# Patient Record
Sex: MALE | Race: BLACK OR AFRICAN AMERICAN | NOT HISPANIC OR LATINO | Employment: FULL TIME | URBAN - METROPOLITAN AREA
[De-identification: names, ages, dates, MRNs, and addresses within clinical notes are randomized per-mention and may not be internally consistent; named-entity substitution may affect disease eponyms.]

---

## 2018-01-15 ENCOUNTER — HOSPITAL ENCOUNTER (EMERGENCY)
Facility: OTHER | Age: 28
Discharge: HOME | End: 2018-01-16
Attending: EMERGENCY MEDICINE
Payer: COMMERCIAL

## 2018-01-15 ENCOUNTER — OFFICE VISIT (OUTPATIENT)
Dept: URGENT CARE | Facility: CLINIC | Age: 28
End: 2018-01-15
Payer: COMMERCIAL

## 2018-01-15 VITALS
BODY MASS INDEX: 22.07 KG/M2 | RESPIRATION RATE: 16 BRPM | HEART RATE: 101 BPM | OXYGEN SATURATION: 95 % | WEIGHT: 149 LBS | TEMPERATURE: 99 F | SYSTOLIC BLOOD PRESSURE: 118 MMHG | HEIGHT: 69 IN | DIASTOLIC BLOOD PRESSURE: 82 MMHG

## 2018-01-15 DIAGNOSIS — R50.9 FEVER AND CHILLS: ICD-10-CM

## 2018-01-15 DIAGNOSIS — J45.901 EXACERBATION OF ASTHMA, UNSPECIFIED ASTHMA SEVERITY, UNSPECIFIED WHETHER PERSISTENT: Primary | ICD-10-CM

## 2018-01-15 DIAGNOSIS — J45.909 ASTHMA, UNSPECIFIED ASTHMA SEVERITY, UNSPECIFIED WHETHER COMPLICATED, UNSPECIFIED WHETHER PERSISTENT: Primary | ICD-10-CM

## 2018-01-15 DIAGNOSIS — R68.83 CHILLS: ICD-10-CM

## 2018-01-15 DIAGNOSIS — F41.9 ANXIETY: ICD-10-CM

## 2018-01-15 LAB
ALBUMIN SERPL BCP-MCNC: 4 G/DL
ALP SERPL-CCNC: 65 U/L
ALT SERPL W/O P-5'-P-CCNC: 22 U/L
ANION GAP SERPL CALC-SCNC: 9 MMOL/L
AST SERPL-CCNC: 31 U/L
BASOPHILS # BLD AUTO: 0.01 K/UL
BASOPHILS NFR BLD: 0.2 %
BILIRUB SERPL-MCNC: 0.2 MG/DL
BUN SERPL-MCNC: 12 MG/DL
CALCIUM SERPL-MCNC: 9.4 MG/DL
CHLORIDE SERPL-SCNC: 100 MMOL/L
CO2 SERPL-SCNC: 28 MMOL/L
CREAT SERPL-MCNC: 1 MG/DL
CTP QC/QA: YES
DIFFERENTIAL METHOD: ABNORMAL
EOSINOPHIL # BLD AUTO: 0 K/UL
EOSINOPHIL NFR BLD: 0.4 %
ERYTHROCYTE [DISTWIDTH] IN BLOOD BY AUTOMATED COUNT: 12.5 %
EST. GFR  (AFRICAN AMERICAN): >60 ML/MIN/1.73 M^2
EST. GFR  (NON AFRICAN AMERICAN): >60 ML/MIN/1.73 M^2
FLUAV AG NPH QL: NEGATIVE
FLUBV AG NPH QL: NEGATIVE
GLUCOSE SERPL-MCNC: 142 MG/DL
HCT VFR BLD AUTO: 42.2 %
HGB BLD-MCNC: 14.4 G/DL
LYMPHOCYTES # BLD AUTO: 0.8 K/UL
LYMPHOCYTES NFR BLD: 16 %
MCH RBC QN AUTO: 33.2 PG
MCHC RBC AUTO-ENTMCNC: 34.1 G/DL
MCV RBC AUTO: 97 FL
MONOCYTES # BLD AUTO: 0.1 K/UL
MONOCYTES NFR BLD: 2.3 %
NEUTROPHILS # BLD AUTO: 3.9 K/UL
NEUTROPHILS NFR BLD: 80.9 %
PLATELET # BLD AUTO: 151 K/UL
PMV BLD AUTO: 10.2 FL
POTASSIUM SERPL-SCNC: 4.8 MMOL/L
PROT SERPL-MCNC: 7.6 G/DL
RBC # BLD AUTO: 4.34 M/UL
SODIUM SERPL-SCNC: 137 MMOL/L
WBC # BLD AUTO: 4.76 K/UL

## 2018-01-15 PROCEDURE — 85025 COMPLETE CBC W/AUTO DIFF WBC: CPT

## 2018-01-15 PROCEDURE — 99284 EMERGENCY DEPT VISIT MOD MDM: CPT | Mod: 25

## 2018-01-15 PROCEDURE — 87804 INFLUENZA ASSAY W/OPTIC: CPT | Mod: 59,QW,S$GLB, | Performed by: FAMILY MEDICINE

## 2018-01-15 PROCEDURE — 94640 AIRWAY INHALATION TREATMENT: CPT

## 2018-01-15 PROCEDURE — 99204 OFFICE O/P NEW MOD 45 MIN: CPT | Mod: 25,S$GLB,, | Performed by: FAMILY MEDICINE

## 2018-01-15 PROCEDURE — 63600175 PHARM REV CODE 636 W HCPCS: Performed by: EMERGENCY MEDICINE

## 2018-01-15 PROCEDURE — 80053 COMPREHEN METABOLIC PANEL: CPT

## 2018-01-15 PROCEDURE — 25000242 PHARM REV CODE 250 ALT 637 W/ HCPCS: Performed by: EMERGENCY MEDICINE

## 2018-01-15 PROCEDURE — 94640 AIRWAY INHALATION TREATMENT: CPT | Mod: 59,S$GLB,, | Performed by: FAMILY MEDICINE

## 2018-01-15 PROCEDURE — 96372 THER/PROPH/DIAG INJ SC/IM: CPT | Mod: S$GLB,,, | Performed by: FAMILY MEDICINE

## 2018-01-15 RX ORDER — BENZONATATE 100 MG/1
200 CAPSULE ORAL 3 TIMES DAILY PRN
Qty: 30 CAPSULE | Refills: 0 | Status: SHIPPED | OUTPATIENT
Start: 2018-01-15 | End: 2018-01-25

## 2018-01-15 RX ORDER — ALBUTEROL SULFATE 0.83 MG/ML
2.5 SOLUTION RESPIRATORY (INHALATION)
Status: COMPLETED | OUTPATIENT
Start: 2018-01-15 | End: 2018-01-15

## 2018-01-15 RX ORDER — BETAMETHASONE SODIUM PHOSPHATE AND BETAMETHASONE ACETATE 3; 3 MG/ML; MG/ML
6 INJECTION, SUSPENSION INTRA-ARTICULAR; INTRALESIONAL; INTRAMUSCULAR; SOFT TISSUE
Status: COMPLETED | OUTPATIENT
Start: 2018-01-15 | End: 2018-01-15

## 2018-01-15 RX ORDER — PREDNISONE 20 MG/1
60 TABLET ORAL
Status: COMPLETED | OUTPATIENT
Start: 2018-01-15 | End: 2018-01-15

## 2018-01-15 RX ORDER — BUDESONIDE 0.5 MG/2ML
0.5 INHALANT ORAL 2 TIMES DAILY
Qty: 120 ML | Refills: 0 | Status: SHIPPED | OUTPATIENT
Start: 2018-01-15 | End: 2018-02-14

## 2018-01-15 RX ORDER — IPRATROPIUM BROMIDE 0.5 MG/2.5ML
0.5 SOLUTION RESPIRATORY (INHALATION)
Status: COMPLETED | OUTPATIENT
Start: 2018-01-15 | End: 2018-01-15

## 2018-01-15 RX ORDER — AZITHROMYCIN 250 MG/1
TABLET, FILM COATED ORAL
Qty: 2 TABLET | Refills: 0 | Status: SHIPPED | OUTPATIENT
Start: 2018-01-15 | End: 2018-01-20

## 2018-01-15 RX ORDER — IPRATROPIUM BROMIDE AND ALBUTEROL SULFATE 2.5; .5 MG/3ML; MG/3ML
3 SOLUTION RESPIRATORY (INHALATION)
Status: COMPLETED | OUTPATIENT
Start: 2018-01-15 | End: 2018-01-15

## 2018-01-15 RX ORDER — ALBUTEROL SULFATE 0.63 MG/3ML
0.63 SOLUTION RESPIRATORY (INHALATION) EVERY 6 HOURS PRN
COMMUNITY
End: 2019-01-17

## 2018-01-15 RX ADMIN — PREDNISONE 60 MG: 20 TABLET ORAL at 09:01

## 2018-01-15 RX ADMIN — IPRATROPIUM BROMIDE AND ALBUTEROL SULFATE 3 ML: .5; 3 SOLUTION RESPIRATORY (INHALATION) at 10:01

## 2018-01-15 RX ADMIN — IPRATROPIUM BROMIDE 0.5 MG: 0.5 SOLUTION RESPIRATORY (INHALATION) at 07:01

## 2018-01-15 RX ADMIN — ALBUTEROL SULFATE 2.5 MG: 0.83 SOLUTION RESPIRATORY (INHALATION) at 07:01

## 2018-01-15 RX ADMIN — BETAMETHASONE SODIUM PHOSPHATE AND BETAMETHASONE ACETATE 6 MG: 3; 3 INJECTION, SUSPENSION INTRA-ARTICULAR; INTRALESIONAL; INTRAMUSCULAR; SOFT TISSUE at 07:01

## 2018-01-15 RX ADMIN — IPRATROPIUM BROMIDE AND ALBUTEROL SULFATE 3 ML: .5; 3 SOLUTION RESPIRATORY (INHALATION) at 11:01

## 2018-01-16 VITALS
TEMPERATURE: 99 F | WEIGHT: 149 LBS | DIASTOLIC BLOOD PRESSURE: 70 MMHG | RESPIRATION RATE: 20 BRPM | SYSTOLIC BLOOD PRESSURE: 145 MMHG | OXYGEN SATURATION: 91 % | HEIGHT: 69 IN | BODY MASS INDEX: 22.07 KG/M2 | HEART RATE: 114 BPM

## 2018-01-16 RX ORDER — PREDNISONE 20 MG/1
40 TABLET ORAL DAILY
Qty: 10 TABLET | Refills: 0 | Status: SHIPPED | OUTPATIENT
Start: 2018-01-16 | End: 2018-01-21

## 2018-01-16 NOTE — ED PROVIDER NOTES
"Encounter Date: 1/15/2018    SCRIBE #1 NOTE: I, Amee Benavidez, am scribing for, and in the presence of, Dr. Zamarripa.       History     Chief Complaint   Patient presents with    Hypoxia     Pt sent to ED from urgent care for dec o2 sats     Time seen by provider: 9:41 PM    This is a 27 y.o. male, with h/o asthma, who presents with complaint of hypoxia. He started to have URI sx 2 weeks ago, including sneezing, cough, SOB, wheezing, and general weakness. Four days ago he started to have fever, back pain ("tight"), myalgias, and headache. He was seen in urgent care initially, was given two breathing treatments and steroid shot with some improvement, but was sent here for SpO2 of 81%. He has not had steroids before for asthma flare ups, but uses Albuterol regularly. He denies abdominal pain, nausea, diarrhea, constipation, or rash.       The history is provided by the patient.     Review of patient's allergies indicates:  No Known Allergies  Past Medical History:   Diagnosis Date    Anxiety     Asthma     HIV (human immunodeficiency virus infection)      History reviewed. No pertinent surgical history.  Family History   Problem Relation Age of Onset    Diabetes Mother      Social History   Substance Use Topics    Smoking status: Current Every Day Smoker    Smokeless tobacco: Never Used      Comment: 1 pack every 3 days    Alcohol use No     Review of Systems   Constitutional: Positive for fever.   HENT: Positive for sneezing. Negative for sore throat.    Respiratory: Positive for cough, shortness of breath and wheezing.    Cardiovascular: Negative for chest pain.   Gastrointestinal: Negative for abdominal pain, constipation and diarrhea.   Genitourinary: Negative for dysuria.   Musculoskeletal: Positive for back pain and myalgias.   Skin: Negative for rash.   Neurological: Positive for weakness and headaches.   Hematological: Does not bruise/bleed easily.       Physical Exam     Initial Vitals [01/15/18 " 2056]   BP Pulse Resp Temp SpO2   134/75 (!) 111 18 99.3 °F (37.4 °C) (!) 90 %      MAP       94.67         Physical Exam    Nursing note and vitals reviewed.  Constitutional: He appears well-developed and well-nourished. He is not diaphoretic. No distress.   HENT:   Head: Normocephalic and atraumatic.   Eyes: Conjunctivae and EOM are normal.   Neck: Normal range of motion. Neck supple.   Cardiovascular: Normal rate, regular rhythm and normal heart sounds. Exam reveals no gallop and no friction rub.    No murmur heard.  Pulmonary/Chest: No respiratory distress. He has decreased breath sounds (diffusely). He has no wheezes. He has no rhonchi. He has no rales.   Abdominal: Soft. Bowel sounds are normal. He exhibits no distension. There is no tenderness. There is no rebound and no guarding.   Musculoskeletal: Normal range of motion. He exhibits no edema or tenderness.   Neurological: He is alert and oriented to person, place, and time.   Skin: Skin is warm and dry. No rash noted. No pallor.   Psychiatric: He has a normal mood and affect. His behavior is normal. Judgment and thought content normal.         ED Course   Procedures  Labs Reviewed   CBC W/ AUTO DIFFERENTIAL - Abnormal; Notable for the following:        Result Value    RBC 4.34 (*)     MCH 33.2 (*)     Lymph # 0.8 (*)     Mono # 0.1 (*)     Gran% 80.9 (*)     Lymph% 16.0 (*)     Mono% 2.3 (*)     All other components within normal limits   COMPREHENSIVE METABOLIC PANEL - Abnormal; Notable for the following:     Glucose 142 (*)     All other components within normal limits             Medical Decision Making:   Initial Assessment:       28 y/o male with h/o asthma sent from Urgent Care due to hypoxia. He had URI symptoms for the past two weeks that worsened in the past four days to flu-like syndrome with cough, myalgias. Work up at urgent care with no pneumonia on X-Ray and negative flu. He was given steroid shot and nebs with some improvement. O2 sat 91% on  arrival with no wheezing, patient resting comfortably with no distress.   Likely asthma exacerbation due to URI.    Labs show no acute findings. He was given Prednisone and additional nebs with no wheezing or SOB on reassessment. His O2 sat remained in the low 90s on room air and with activity. I discussed admission vs discharge with patient, and patient does not want admission since he feels much better. Will add Prednisone x 4 additional days to Rx given by Urgent Care which was Z-Daquan, steroid inhaler, cough suppressant. Will return for any worsening symptoms and follow up with PCP.      Clinical Tests:   Lab Tests: Ordered and Reviewed            Scribe Attestation:   Scribe #1: I performed the above scribed service and the documentation accurately describes the services I performed. I attest to the accuracy of the note.    Attending Attestation:           Physician Attestation for Scribe:  Physician Attestation Statement for Scribe #1: I, Dr. Zamarripa, reviewed documentation, as scribed by Amee Benavidez in my presence, and it is both accurate and complete.                 ED Course      Clinical Impression:     1. Exacerbation of asthma, unspecified asthma severity, unspecified whether persistent                                 Jimbo Zamarripa MD  01/16/18 0255

## 2018-01-16 NOTE — PATIENT INSTRUCTIONS
"  Asthma (Adult)  Asthma is a disease where the medium and  small air passages within the lung go into spasm and restrict the flow of air. Inflammation and swelling of the airways cause further restriction. During an acute asthma attack, these factors cause difficulty breathing, wheezing, cough and chest tightness.    An asthma attack can be triggered by many things. Common triggers include infections such as the common cold, bronchitis, pneumonia. Irritants such as smoke or pollutants in the air, emotional upset, and exercise can also trigger an attack. In many adults with asthma, allergies to dust, mold, pollen and animal dander can cause an asthma attack. Skipping doses of daily asthma medicine can also bring on an asthma attack.  Asthma can be controlled using the proper medicines prescribed by your healthcare provider and avoiding exposure to known triggers including allergens and irritants.  Home care  · Take prescribed medicine exactly at the times advised. If you need medicine such as from a hand held inhaler or aerosol breathing machine more than every 4 hours, contact your healthcare provider or seek immediate medical attention. If prescribed an antibiotic or prednisone, take all of the medicine as prescribed, even if you are feeling better after a few days.  · Do not smoke. Avoid being exposed to the smoke of others.  · Some people with asthma have worsening of their symptoms when they take aspirin and non-steroidal or fever-reducing medicines like ibuprofen and naproxen. Talk to your healthcare provider if you think this may apply to you.  Follow-up care  Follow up with your healthcare provider, or as advised. Always bring all of your current medicines to any appointments with your healthcare provider. Also bring a complete list of medications even those not taken for asthma. If you do not already have one, talk to your healthcare provider about developing a personalized "Asthma Action Plan."  A " pneumococcal (pneumonia) vaccine and yearly flu shot (every fall) are recommended. Ask your doctor about this.  When to seek medical advice  Call your healthcare provider right away if any of these occur:   · Increased wheezing or shortness of breath  · Need to use your inhalers more often than usual without relief  · Fever of 100.4ºF (38ºC) or higher, or as directed by your healthcare provider  · Coughing up lots of dark-colored or bloody sputum (mucus)  · Chest pain with each breath  · If you use a peak flow meter as part of an Asthma Action Plan, and you are still in the yellow zone (50% to 80%) 15 minutes after using inhaler medicine.  Call 911  Call 911 if any of the following occur  · Trouble walking or talking because of shortness of breath  · If you use a peak flow meter as part of an Asthma Action Plan and you are still in the red zone (less than 50%) 15 minutes after using inhaler medicine  · Lips or fingernails turning gray or blue  Date Last Reviewed: 12/2/2015  © 6934-9471 [employer]. [address] [place] [address], [place] [address]. All rights reserved. This information is not intended as a substitute for professional medical care. Always follow your healthcare professional's instructions.

## 2018-01-16 NOTE — PROGRESS NOTES
"Subjective:       Patient ID: Roosevelt Dove is a 27 y.o. male.    Vitals:  height is 5' 9" (1.753 m) and weight is 67.6 kg (149 lb). His temperature is 99 °F (37.2 °C). His blood pressure is 118/82 and his pulse is 101. His respiration is 16 and oxygen saturation is 95%.     Chief Complaint: Generalized Body Aches (started Thursday)    Pt started having a cough and nasal congestion about 2 weeks ago. Pt started getting body aches on Thursday. Pt tried taking Theraflu Liquid Night time and advil cold and sinus, but no improvement. Pt says his mouth been dry for the past couple of days. Pt been using his nebulizer every 2 hours because sometimes he get SOB. Pt does have a hx of asthma. He uses his albuterol inhaler as needed. Pt work at a [employer].   Patient does not know who is PCP is.  He reports that they gave him a refill on asthma medication for about 10 times.  His mother gave him one abx  Today for his symptoms.  He can not take deep breaths at this time.  You can here him audibly wheeze without a stethoscope. Bb  Patient has HIV and is undetectable but prefer it not be in his history. Bb      Other   This is a chronic problem. The current episode started 1 to 4 weeks ago (2 weeks ago). The problem occurs constantly. The problem has been gradually worsening. Associated symptoms include chills, congestion, coughing (dry ) and headaches (frontal). Pertinent negatives include no abdominal pain, chest pain, fever, myalgias, nausea or sore throat.     Review of Systems   Constitution: Positive for chills and decreased appetite. Negative for fever and malaise/fatigue.   HENT: Positive for congestion. Negative for ear pain, hoarse voice and sore throat.    Eyes: Negative for discharge and redness.   Cardiovascular: Negative for chest pain, dyspnea on exertion and leg swelling.   Respiratory: Positive for cough (dry ) and wheezing. Negative for shortness of breath and sputum production.  "   Musculoskeletal: Negative for myalgias.   Gastrointestinal: Negative for abdominal pain and nausea.   Neurological: Positive for headaches (frontal).       Objective:      Physical Exam   Constitutional: He is oriented to person, place, and time. He appears well-developed and well-nourished. He is cooperative.  Non-toxic appearance. He appears ill. No distress.   HENT:   Head: Normocephalic and atraumatic.   Right Ear: Hearing, tympanic membrane, external ear and ear canal normal.   Left Ear: Hearing, tympanic membrane, external ear and ear canal normal.   Nose: Nose normal. No mucosal edema, rhinorrhea or nasal deformity. No epistaxis. Right sinus exhibits no maxillary sinus tenderness and no frontal sinus tenderness. Left sinus exhibits no maxillary sinus tenderness and no frontal sinus tenderness.   Mouth/Throat: Uvula is midline and mucous membranes are normal. No trismus in the jaw. Normal dentition. No uvula swelling. Posterior oropharyngeal erythema present.   Eyes: Conjunctivae and lids are normal. No scleral icterus.   Sclera clear bilat   Neck: Trachea normal, full passive range of motion without pain and phonation normal. Neck supple.   Cardiovascular: Regular rhythm, normal heart sounds, intact distal pulses and normal pulses.  Tachycardia present.    Pulmonary/Chest: Effort normal. No respiratory distress. He has decreased breath sounds.   Pre peak flow is 220  Post is 260 from one treatment 1945.    After two treatments patient has better air movement with his peak flow still being in the low 200s  He was instructed to follow up with his PCP next week, or report to the ED for any SOB, or wheezing.     Patient transferred to [facility] ED spoke with Jordan Charge nurse for report  Patient stable when  Leaving clinic.  Speaking in full sentences.  Bb       Abdominal: Soft. Normal appearance and bowel sounds are normal. He exhibits no distension. There is no tenderness.   Musculoskeletal: Normal range of  motion. He exhibits no edema or deformity.   Neurological: He is alert and oriented to person, place, and time. He exhibits normal muscle tone. Coordination normal.   Skin: Skin is warm, dry and intact. He is not diaphoretic. No pallor.   Psychiatric: He has a normal mood and affect. His speech is normal and behavior is normal. Judgment and thought content normal. Cognition and memory are normal.   Nursing note and vitals reviewed.     No pneumonia noted on films.  Two nipple studs noted on film.  Results for orders placed or performed in visit on 01/15/18   POCT Influenza A/B   Result Value Ref Range    Rapid Influenza A Ag Negative Negative    Rapid Influenza B Ag Negative Negative     Acceptable Yes        Assessment:       1. Asthma, unspecified asthma severity, unspecified whether complicated, unspecified whether persistent    2. Chills    3. Fever and chills    4. Anxiety        Plan:         Asthma, unspecified asthma severity, unspecified whether complicated, unspecified whether persistent  -     albuterol nebulizer solution 2.5 mg; Take 3 mLs (2.5 mg total) by nebulization one time.  -     ipratropium 0.02 % nebulizer solution 0.5 mg; Take 2.5 mLs (0.5 mg total) by nebulization one time.  -     X-Ray Chest PA And Lateral; Future; Expected date: 01/15/2018  -     Vital capacity test; Future; Expected date: 01/15/2018  -     benzonatate (TESSALON PERLES) 100 MG capsule; Take 2 capsules (200 mg total) by mouth 3 (three) times daily as needed for Cough.  Dispense: 30 capsule; Refill: 0  -     Cancel: Refer to Emergency Dept.    Chills  -     POCT Influenza A/B    Fever and chills  -     X-Ray Chest PA And Lateral; Future; Expected date: 01/15/2018    Anxiety    Other orders  -     betamethasone acetate-betamethasone sodium phosphate injection 6 mg; Inject 1 mL (6 mg total) into the muscle one time.  -     albuterol nebulizer solution 2.5 mg; Take 3 mLs (2.5 mg total) by nebulization one time.  -      ipratropium 0.02 % nebulizer solution 0.5 mg; Take 2.5 mLs (0.5 mg total) by nebulization one time.  -     budesonide (PULMICORT) 0.5 mg/2 mL nebulizer solution; Take 2 mLs (0.5 mg total) by nebulization 2 (two) times daily. Use every 6 hours while acutely ill then decrease to twice daily  Dispense: 120 mL; Refill: 0  -     azithromycin (ZITHROMAX Z-TERESA) 250 MG tablet; Take 2 tablets (500 mg) on  Day 1,  followed by 1 tablet (250 mg) once daily on Days 2 through 5.  Dispense: 2 tablet; Refill: 0

## 2018-01-16 NOTE — ED TRIAGE NOTES
"Pt states " I was at urgent care and my oxygen was around 81% on room air. The doctor at urgent care told me that I needed to come to the ER. I received 2 breathing treatments and it didn't help my oxygen level. I have been sick for about 2 weeks. They did the flu swab at urgent care and it was negative. ". Pt sat 91% RA upon assessment. 2L NC applied to pt. Will continue to monitor.   "

## 2019-01-06 ENCOUNTER — HOSPITAL ENCOUNTER (EMERGENCY)
Facility: OTHER | Age: 29
Discharge: HOME | End: 2019-01-06
Attending: EMERGENCY MEDICINE
Payer: COMMERCIAL

## 2019-01-06 VITALS
HEART RATE: 102 BPM | SYSTOLIC BLOOD PRESSURE: 138 MMHG | OXYGEN SATURATION: 96 % | WEIGHT: 145 LBS | BODY MASS INDEX: 21.48 KG/M2 | RESPIRATION RATE: 18 BRPM | DIASTOLIC BLOOD PRESSURE: 83 MMHG | TEMPERATURE: 98 F | HEIGHT: 69 IN

## 2019-01-06 DIAGNOSIS — J45.901 EXACERBATION OF ASTHMA, UNSPECIFIED ASTHMA SEVERITY, UNSPECIFIED WHETHER PERSISTENT: ICD-10-CM

## 2019-01-06 DIAGNOSIS — J06.9 VIRAL URI WITH COUGH: Primary | ICD-10-CM

## 2019-01-06 PROCEDURE — 94640 AIRWAY INHALATION TREATMENT: CPT

## 2019-01-06 PROCEDURE — 63600175 PHARM REV CODE 636 W HCPCS: Performed by: PHYSICIAN ASSISTANT

## 2019-01-06 PROCEDURE — 25000242 PHARM REV CODE 250 ALT 637 W/ HCPCS: Performed by: PHYSICIAN ASSISTANT

## 2019-01-06 PROCEDURE — 99285 EMERGENCY DEPT VISIT HI MDM: CPT | Mod: 25

## 2019-01-06 RX ORDER — PREDNISONE 20 MG/1
60 TABLET ORAL
Status: COMPLETED | OUTPATIENT
Start: 2019-01-06 | End: 2019-01-06

## 2019-01-06 RX ORDER — BENZONATATE 100 MG/1
100 CAPSULE ORAL 3 TIMES DAILY PRN
Qty: 20 CAPSULE | Refills: 0 | Status: SHIPPED | OUTPATIENT
Start: 2019-01-06 | End: 2019-01-16

## 2019-01-06 RX ORDER — IPRATROPIUM BROMIDE AND ALBUTEROL SULFATE 2.5; .5 MG/3ML; MG/3ML
3 SOLUTION RESPIRATORY (INHALATION)
Status: COMPLETED | OUTPATIENT
Start: 2019-01-06 | End: 2019-01-06

## 2019-01-06 RX ORDER — PREDNISONE 20 MG/1
60 TABLET ORAL DAILY
Qty: 12 TABLET | Refills: 0 | Status: SHIPPED | OUTPATIENT
Start: 2019-01-06 | End: 2019-01-10

## 2019-01-06 RX ORDER — ALBUTEROL SULFATE 2.5 MG/.5ML
2.5 SOLUTION RESPIRATORY (INHALATION) EVERY 4 HOURS PRN
Qty: 20 EACH | Refills: 0 | OUTPATIENT
Start: 2019-01-06 | End: 2019-01-17

## 2019-01-06 RX ADMIN — IPRATROPIUM BROMIDE AND ALBUTEROL SULFATE 3 ML: .5; 3 SOLUTION RESPIRATORY (INHALATION) at 11:01

## 2019-01-06 RX ADMIN — PREDNISONE 60 MG: 20 TABLET ORAL at 11:01

## 2019-01-06 NOTE — ED PROVIDER NOTES
"Encounter Date: 1/6/2019       History     Chief Complaint   Patient presents with    Cough     Pt c/o clear productive cough for the past week with frequent "asthma attacks" relieved with tx's.     Asthma     Patient is a 28-year-old male with a past medical history of asthma, HIV, presenting to the emergency department with complaints of a cough with asthma exacerbation.  The patient states that for the past week he has had an upper respiratory infection.  He states he feels as though it is making his asthma worse, especially over the past 2 days.  He admits that prior to arrival he took a home breathing treatment, stating it helped but that his symptoms are already returning.  He reports his cough is productive of sputum and phlegm.  He denies any shortness of breath or chest pain.  He denies any fever chills. Of note, he is unsure of his last CD4 count or viral load but states he has undetectable.  Last seen 6 months ago takes his medication regularly, due to be seen again seen.This is the extent of the patient's complaints at this time.         The history is provided by the patient.     Review of patient's allergies indicates:  No Known Allergies  Past Medical History:   Diagnosis Date    Anxiety     Asthma     HIV (human immunodeficiency virus infection)      History reviewed. No pertinent surgical history.  Family History   Problem Relation Age of Onset    Diabetes Mother      Social History     Tobacco Use    Smoking status: Current Every Day Smoker    Smokeless tobacco: Never Used    Tobacco comment: 1 pack every 3 days   Substance Use Topics    Alcohol use: No    Drug use: No     Review of Systems   Constitutional: Negative for activity change, chills, fatigue and fever.   HENT: Positive for congestion and rhinorrhea. Negative for sore throat.    Eyes: Negative for photophobia and visual disturbance.   Respiratory: Positive for cough, chest tightness and shortness of breath.    Cardiovascular: " Negative for chest pain.   Gastrointestinal: Negative for abdominal pain, diarrhea, nausea and vomiting.   Genitourinary: Negative for dysuria, hematuria and urgency.   Musculoskeletal: Negative for back pain, myalgias and neck pain.   Skin: Negative for color change and wound.   Neurological: Negative for weakness and headaches.   Psychiatric/Behavioral: Negative for agitation and confusion.       Physical Exam     Initial Vitals [01/06/19 1037]   BP Pulse Resp Temp SpO2   (!) 143/87 99 18 98.3 °F (36.8 °C) 95 %      MAP       --         Physical Exam    Nursing note and vitals reviewed.  Constitutional: Vital signs are normal. He appears well-developed and well-nourished. He is not diaphoretic.  Non-toxic appearance. He does not have a sickly appearance. He does not appear ill. No distress.   Well-appearing,  male unaccompanied in the emergency department.  Speaking clearly in full sentences.  No acute distress.   HENT:   Head: Normocephalic and atraumatic.   Right Ear: External ear normal.   Left Ear: External ear normal.   Nose: Nose normal.   Mouth/Throat: Oropharynx is clear and moist.   Eyes: Conjunctivae and EOM are normal.   Neck: Normal range of motion. Neck supple.   Cardiovascular: Normal rate, regular rhythm and normal heart sounds.   Pulmonary/Chest: No respiratory distress.   Decreased breath sounds bilaterally with expiratory wheezing.  No respiratory distress. Occasional coughing noted on exam.   Musculoskeletal: Normal range of motion.   Neurological: He is alert and oriented to person, place, and time.   Skin: Skin is warm.   Psychiatric: He has a normal mood and affect. His behavior is normal. Judgment and thought content normal.         ED Course   Procedures  Labs Reviewed - No data to display       Imaging Results          X-Ray Chest PA And Lateral (Final result)  Result time 01/06/19 11:52:11    Final result by Chay Gaines MD (01/06/19 11:52:11)                 Impression:       No acute disease or interval change.      Electronically signed by: Chay Gaines MD  Date:    01/06/2019  Time:    11:52             Narrative:    EXAMINATION:  XR CHEST PA AND LATERAL    CLINICAL HISTORY:  Cough    TECHNIQUE:  PA and lateral views of the chest were performed.    COMPARISON:  Chest x-ray from January 15, 2018.    FINDINGS:  Jewelry artifact projects about the nipples.  Lungs are clear without focal pulmonary consolidation.  Thoracic spine scoliosis is again seen.                              X-Rays:   Independently Interpreted Readings:   Chest X-Ray: Normal heart size.  No infiltrates.  No acute abnormalities. Radiopaque foreign bodies noted bilateral nipples.  Scoliosis noted.     Medical Decision Making:   Initial Assessment:   Urgent evaluation of a 28-year-old male presenting to the emergency department with complaints of cough and asthma exacerbation.  Patient is afebrile, nontoxic appearing, hemodynamically stable.  Regular rate rhythm. Decreased breath sounds bilaterally with expiratory wheezing noted. No respiratory distress.  Oxygen saturation is 95%.  Will plan for chest x-ray, duo nebs, prednisone and reassess.  Independently Interpreted Test(s):   I have ordered and independently interpreted X-rays - see prior notes.  Clinical Tests:   Radiological Study: Ordered and Reviewed  ED Management:  Chest x-ray shows no significant findings.  On reassessment after DuoNeb, patient reports he feels much improved.  Breath sounds are significantly improved with only faint wheezing noted at the bases.  He states his cough is improved.  I do not feel that the patient requires any further intervention in the emergency department.  Signs symptoms are consistent with a viral URI that is exacerbated his asthma.  No indication of acute bacterial infection that requires antibiotics.  At this point, will discharge home with a prescription for Tessalon Perles, prednisone, and a refill for his  albuterol for his nebulizer.  He is counseled extensively on symptomatic care and treatment.  Discharged home in stable condition.The patient was instructed to follow up with a primary care provider in 2 days or to return to the emergency department for worsening symptoms. The treatment plan was discussed with the patient who demonstrated understanding and comfort with plan.    This note was created using [employer]. There may be typographical errors secondary to dictation.                         Clinical Impression:     1. Viral URI with cough    2. Exacerbation of asthma, unspecified asthma severity, unspecified whether persistent          Disposition:   Disposition: Discharged  Condition: Stable                        Nafisa Andrade PA-C  01/06/19 120

## 2019-01-06 NOTE — ED NOTES
Pt sitting in recliner. Denies pain. Respirations even and unlabored. POC discussed. Vs monitoring in place. Needs addressed.

## 2019-01-06 NOTE — ED NOTES
Patient Identifiers for Roosevelt Dove checked and correct  LOC: The patient is awake, alert and aware of environment with an appropriate affect, the patient is oriented x 3 and speaking appropriate.  APPEARANCE: Patient resting comfortably and in no acute distress. The patient is clean and well groomed. The patient's clothing is properly fastened.  SKIN: The skin is warm and dry. The patient has normal skin turgor and moist mucus membranes. No rashes or lesions upon observation. Skin Intact , no breakdown noted.  Musculoskeletal :  Normal range of motion noted. Moves all extremities well.  RESPIRATORY: Airway is open and patent, respirations are spontaneous, patient has a normal effort and rate. Expiratory wheezing auscultated in all fields with decreased flow.  CARDIAC: Patient has a normal rate and rhythm, no peripheral edema noted, capillary refill < 3 seconds.   PULSES: 2+ radial pulses, symmetrical.    Will continue to monitor

## 2019-01-06 NOTE — ED TRIAGE NOTES
Pt c/o a clear/white productive cough for the past week. Pt also reports having to give himself tx's at home for increase SOB secondary to asthma. Pt currently in NAD with no SOB.

## 2019-01-17 ENCOUNTER — HOSPITAL ENCOUNTER (EMERGENCY)
Facility: OTHER | Age: 29
Discharge: HOME | End: 2019-01-17
Attending: EMERGENCY MEDICINE
Payer: COMMERCIAL

## 2019-01-17 VITALS
DIASTOLIC BLOOD PRESSURE: 73 MMHG | BODY MASS INDEX: 21.48 KG/M2 | HEIGHT: 69 IN | RESPIRATION RATE: 18 BRPM | TEMPERATURE: 98 F | HEART RATE: 82 BPM | SYSTOLIC BLOOD PRESSURE: 119 MMHG | OXYGEN SATURATION: 94 % | WEIGHT: 145 LBS

## 2019-01-17 DIAGNOSIS — J45.909 ASTHMA: Primary | ICD-10-CM

## 2019-01-17 PROCEDURE — 25000242 PHARM REV CODE 250 ALT 637 W/ HCPCS: Performed by: EMERGENCY MEDICINE

## 2019-01-17 PROCEDURE — 99284 EMERGENCY DEPT VISIT MOD MDM: CPT

## 2019-01-17 PROCEDURE — 94640 AIRWAY INHALATION TREATMENT: CPT

## 2019-01-17 PROCEDURE — 94761 N-INVAS EAR/PLS OXIMETRY MLT: CPT

## 2019-01-17 RX ORDER — ALBUTEROL SULFATE 1.25 MG/3ML
1.25 SOLUTION RESPIRATORY (INHALATION) EVERY 6 HOURS PRN
Qty: 1 BOX | Refills: 0 | Status: SHIPPED | OUTPATIENT
Start: 2019-01-17 | End: 2020-01-17

## 2019-01-17 RX ORDER — IPRATROPIUM BROMIDE AND ALBUTEROL SULFATE 2.5; .5 MG/3ML; MG/3ML
3 SOLUTION RESPIRATORY (INHALATION)
Status: COMPLETED | OUTPATIENT
Start: 2019-01-17 | End: 2019-01-17

## 2019-01-17 RX ORDER — BUDESONIDE AND FORMOTEROL FUMARATE DIHYDRATE 160; 4.5 UG/1; UG/1
2 AEROSOL RESPIRATORY (INHALATION) EVERY 12 HOURS
Qty: 10.2 G | Refills: 0 | Status: SHIPPED | OUTPATIENT
Start: 2019-01-17

## 2019-01-17 RX ADMIN — IPRATROPIUM BROMIDE AND ALBUTEROL SULFATE 3 ML: .5; 3 SOLUTION RESPIRATORY (INHALATION) at 11:01

## 2019-01-17 NOTE — DISCHARGE INSTRUCTIONS
We have prescribed you inhalers.  Please fill and take as directed.    Please return to the ER if you have chest pain, difficulty breathing, fevers, altered mental status, dizziness, weakness, or any other concerns.      Follow up with your primary care physician.

## 2019-01-17 NOTE — ED TRIAGE NOTES
Pt reports to ED c/o intermittent productive cough with fatigue unrelieved from asthma inhaler or nebulizer. Pt seen in ED x 2 weeks ago for same c/o but reports cough has not resolved or improved. PMH of HIV, compliant with meds. Denies fevers.     Patient identifiers for [facility] Dove checked and correct.  LOC: Patient is awake, alert, and aware of environment with an appropriate affect. Patient is oriented x 3 and speaking appropriately.  APPEARANCE: Patient resting comfortably and in no acute distress. Patient is clean and well groomed, patient's clothing is properly fastened.  SKIN: The skin is warm and dry. Patient has normal skin turgor and moist mucus membrances. Skin is intact; no bruising or breakdown noted.  MUSKULOSKELETAL: Patient is moving all extremities well, no obvious swelling or deformities noted. Pulses intact.   RESPIRATORY: Airway is open and patent. Respirations are spontaneous, even and unlabored. Normal effort and rate noted. Chest congestion noted.   Allergies reported: Review of patient's allergies indicates:  No Known Allergies

## 2019-01-17 NOTE — ED PROVIDER NOTES
"Encounter Date: 1/17/2019    SCRIBE #1 NOTE: I, Martin Terrazas, am scribing for, and in the presence of, Dr. Gold .       History     Chief Complaint   Patient presents with    Cough     Pt c/o mildly productive cough with asthma exacerbation. Pt treated in ED 2 weeks ago for URI & asthma exacerbation. Pt states "My asthma keeps flarin up & I'm still coughing." Pt completed prescribed steroids, denies relief from tessalon perles..Pt also reports using his nebulizer & inhaler with short term relief.     Time seen by provider: 11:42 AM    This is a 28 y.o. male, with history of asthma and HIV, who presents with complaint of persistent productive cough consisting of "thick, yellow mucus" for approximately four days. Patient states he presented to the ED two weeks ago for asthma exacerbation and dry cough. He states he was prescribed albuterol and "three pills" of steroids, which he has complied with. He notes the cough and symptoms improved, but it never resolved, and now has worsened again. He has not been experiencing fevers, chills, headaches, dizziness, congestion, rhinorrhea, sore throat, SOB, chest pain, abdominal pain, N/V/D, dysuria, urinary frequency, or urinary urgency. He admits use of tobacco (6-7 cigarettes a day).  He uses an albuterol inhaler but does not use a steroid inhaler.        The history is provided by the patient.     Review of patient's allergies indicates:  No Known Allergies  Past Medical History:   Diagnosis Date    Anxiety     Asthma     HIV (human immunodeficiency virus infection)      History reviewed. No pertinent surgical history.  Family History   Problem Relation Age of Onset    Diabetes Mother      Social History     Tobacco Use    Smoking status: Current Every Day Smoker    Smokeless tobacco: Never Used    Tobacco comment: 1 pack every 3 days   Substance Use Topics    Alcohol use: No    Drug use: No     Review of Systems   Constitutional: Negative for chills and fever. "   HENT: Negative for congestion, rhinorrhea and sore throat.    Respiratory: Positive for cough. Negative for shortness of breath.    Cardiovascular: Negative for chest pain.   Gastrointestinal: Negative for abdominal pain, diarrhea, nausea and vomiting.   Genitourinary: Negative for dysuria, frequency and urgency.   Musculoskeletal: Negative for back pain.   Skin: Negative for rash.   Neurological: Negative for dizziness and headaches.   Psychiatric/Behavioral: Negative for confusion.       Physical Exam     Initial Vitals [01/17/19 1046]   BP Pulse Resp Temp SpO2   130/81 85 20 98.3 °F (36.8 °C) 96 %      MAP       --         Physical Exam    Nursing note and vitals reviewed.  Constitutional: He appears well-developed and well-nourished. No distress.   HENT:   Head: Normocephalic and atraumatic.   Mouth/Throat: Oropharynx is clear and moist.   Eyes: Conjunctivae and EOM are normal.   Neck: Normal range of motion. Neck supple.   Cardiovascular: Normal rate, regular rhythm, normal heart sounds and intact distal pulses.   Pulmonary/Chest: No respiratory distress. He has wheezes. He has no rhonchi. He has no rales.   Intermittent expiratory wheezes present.    Musculoskeletal: Normal range of motion. He exhibits no edema.   Neurological: He is alert and oriented to person, place, and time. He has normal strength. No cranial nerve deficit.   Ambulatory with steady gait.   Skin: Skin is warm and dry.   Psychiatric: He has a normal mood and affect. His behavior is normal. Judgment and thought content normal.         ED Course   Procedures  Labs Reviewed - No data to display       Imaging Results          X-Ray Chest PA And Lateral (Final result)  Result time 01/17/19 11:06:58    Final result by Franko Ellis DO (01/17/19 11:06:58)                 Impression:      See above      Electronically signed by: Franko Ellis DO  Date:    01/17/2019  Time:    11:06             Narrative:    EXAMINATION:  XR CHEST PA AND  LATERAL    CLINICAL HISTORY:  Unspecified asthma, uncomplicated    TECHNIQUE:  PA and lateral views of the chest were performed.    COMPARISON:  01/06/2019    FINDINGS:  No significant change from prior.  No lung consolidation.  No pleural effusion or pneumothorax.  Convex right curvature thoracolumbar spine with convex left curvature upper thoracic spine similar to prior.  Cosmetic nipple piercings again seen.                              X-Rays:   Independently Interpreted Readings:   Chest X-Ray: Trachea midline. No cardiomegaly. No effusion, edema or pneumothorax.        Medical Decision Making:   History:   Old Medical Records: I decided to obtain old medical records.  Old Records Summarized: other records and records from clinic visits.  Initial Assessment:   11:42AM:  Patient is a 28-year-old male who presents to the emergency department with cough and wheezing.  Patient appears well, nontoxic.  He is breathing comfortably with no respiratory distress.  He did get an x-ray from triage which was negative for any evidence of pneumonia.  He does admit that his symptoms did initially improve, but then worsened again.  He is not on any daily maintenance inhaler which he likely needs with his persistent exacerbations.  Will plan for a treatment here, and will start the patient on a steroid inhaler.  Will continue to follow and reassess.  Clinical Tests:   Radiological Study: Ordered and Reviewed    1:06PM:  Patient doing well, he remains stable. He is feeling better after the neb treatment.  Will plan to refill his albuterol nebulizer prescription along with prescribing a steroid inhaler.  I counseled the patient regarding supportive care measures.  I do not feel that further workup in the emergency department is indicated at this time.  I have discussed with the pt ED return warnings and need for close PCP f/u.  Pt agreeable to plan and all questions answered.  I feel that pt is stable for discharge and management  as an outpatient and no further intervention is needed at this time.  Pt is comfortable returning to the ED if needed.  Will DC home in stable condition.                Scribe Attestation:   Scribe #1: I performed the above scribed service and the documentation accurately describes the services I performed. I attest to the accuracy of the note.    Attending Attestation:           Physician Attestation for Scribe:  Physician Attestation Statement for Scribe #1: I, Dr. Gold, reviewed documentation, as scribed by Martin Terrazas  in my presence, and it is both accurate and complete.                    Clinical Impression:     1. Asthma                                   Ana Gold MD  01/17/19 2304